# Patient Record
Sex: MALE | Race: WHITE | Employment: UNEMPLOYED | URBAN - METROPOLITAN AREA
[De-identification: names, ages, dates, MRNs, and addresses within clinical notes are randomized per-mention and may not be internally consistent; named-entity substitution may affect disease eponyms.]

---

## 2020-09-22 ENCOUNTER — HOSPITAL ENCOUNTER (OUTPATIENT)
Dept: GENERAL RADIOLOGY | Age: 25
Discharge: HOME | End: 2020-09-22
Attending: INTERNAL MEDICINE
Payer: MEDICAID

## 2020-09-22 DIAGNOSIS — R05.9 COUGH: ICD-10-CM

## 2020-09-22 PROCEDURE — 71046 X-RAY EXAM CHEST 2 VIEWS: CPT

## 2020-10-08 ENCOUNTER — TRANSCRIBE ORDER (OUTPATIENT)
Dept: SCHEDULING | Age: 25
End: 2020-10-08

## 2020-10-08 DIAGNOSIS — R93.89 ABNORMAL CHEST X-RAY: Primary | ICD-10-CM

## 2020-10-19 ENCOUNTER — TRANSCRIBE ORDER (OUTPATIENT)
Dept: SCHEDULING | Age: 25
End: 2020-10-19

## 2020-10-19 DIAGNOSIS — R05.9 COUGH: Primary | ICD-10-CM

## 2020-11-02 ENCOUNTER — TRANSCRIBE ORDER (OUTPATIENT)
Dept: SCHEDULING | Age: 25
End: 2020-11-02

## 2020-11-02 DIAGNOSIS — J84.10 PULMONARY FIBROSIS (HCC): Primary | ICD-10-CM

## 2020-11-18 ENCOUNTER — HOSPITAL ENCOUNTER (OUTPATIENT)
Dept: CT IMAGING | Age: 25
Discharge: HOME | End: 2020-11-18
Attending: INTERNAL MEDICINE
Payer: MEDICAID

## 2020-11-18 DIAGNOSIS — J84.10 PULMONARY FIBROSIS (HCC): ICD-10-CM

## 2020-11-18 PROCEDURE — 71250 CT THORAX DX C-: CPT

## 2021-09-24 ENCOUNTER — HOSPITAL ENCOUNTER (EMERGENCY)
Age: 26
Discharge: HOME | End: 2021-09-24
Attending: EMERGENCY MEDICINE
Payer: MEDICAID

## 2021-09-24 VITALS
OXYGEN SATURATION: 94 % | HEART RATE: 110 BPM | RESPIRATION RATE: 20 BRPM | TEMPERATURE: 100 F | WEIGHT: 160 LBS | BODY MASS INDEX: 21.67 KG/M2 | DIASTOLIC BLOOD PRESSURE: 76 MMHG | SYSTOLIC BLOOD PRESSURE: 124 MMHG | HEIGHT: 72 IN

## 2021-09-24 DIAGNOSIS — T50.Z95A VACCINE REACTION, INITIAL ENCOUNTER: Primary | ICD-10-CM

## 2021-09-24 PROCEDURE — 99281 EMR DPT VST MAYX REQ PHY/QHP: CPT

## 2021-09-24 NOTE — ED PROVIDER NOTES
75-year-old male with a past medical history significant for autism who presents to the ER accompanied by his mother who state that the patient had the first dose of the vaccine today and seemed to be having chills, body aches, abdominal cramp with nausea and diaphoresis. Mother states that she was concerned that her son was going into anaphylactic shock. The patient denies any headache, sore throat, congestion, chest pain or shortness of breath, diarrhea, constipation, dysuria, dizziness, extremity weakness or numbness. No past medical history on file. No past surgical history on file. No family history on file. Social History     Socioeconomic History    Marital status: SINGLE     Spouse name: Not on file    Number of children: Not on file    Years of education: Not on file    Highest education level: Not on file   Occupational History    Not on file   Tobacco Use    Smoking status: Not on file   Substance and Sexual Activity    Alcohol use: Not on file    Drug use: Not on file    Sexual activity: Not on file   Other Topics Concern    Not on file   Social History Narrative    Not on file     Social Determinants of Health     Financial Resource Strain:     Difficulty of Paying Living Expenses:    Food Insecurity:     Worried About Running Out of Food in the Last Year:     [address] in the Last Year:    Transportation Needs:     Lack of Transportation (Medical):      Lack of Transportation (Non-Medical):    Physical Activity:     Days of Exercise per Week:     Minutes of Exercise per Session:    Stress:     Feeling of Stress :    Social Connections:     Frequency of Communication with Friends and Family:     Frequency of Social Gatherings with Friends and Family:     Attends Zoroastrianism Services:     Active Member of Clubs or Organizations:     Attends Club or Organization Meetings:     Marital Status:    Intimate Partner Violence:     Fear of Current or Ex-Partner:     Emotionally Abused:     Physically Abused:     Sexually Abused: ALLERGIES: Egg, Peanut, Propofol, Seafood, and Fentanyl    Review of Systems   All other systems reviewed and are negative. Vitals:    09/24/21 0010   BP: 124/76   Pulse: (!) 110   Resp: 20   Temp: 100 °F (37.8 °C)   SpO2: 94%   Weight: 72.6 kg (160 lb)   Height: 6' (1.829 m)            Physical Exam  Vitals and nursing note reviewed. CONSTITUTIONAL: Well-appearing; well-nourished; in no apparent distress  HEAD: Normocephalic; atraumatic  EYES: PERRL; EOM intact; conjunctiva and sclera are clear bilaterally. ENT: No rhinorrhea; normal pharynx with no tonsillar hypertrophy; mucous membranes pink/moist, no erythema, no exudate. NECK: Supple; non-tender; no cervical lymphadenopathy  CARD: Normal S1, S2; no murmurs, rubs, or gallops. Regular rate and rhythm. RESP: Normal respiratory effort; breath sounds clear and equal bilaterally; no wheezes, rhonchi, or rales. ABD: Normal bowel sounds; non-distended; non-tender; no palpable organomegaly, no masses, no bruits. Back Exam: Normal inspection; no vertebral point tenderness, no CVA tenderness. Normal range of motion. EXT: Normal ROM in all four extremities; non-tender to palpation; no swelling or deformity; distal pulses are normal, no edema. SKIN: Warm; dry; no rash. NEURO:Alert and oriented x 3, coherent, HANSA-XII grossly intact, sensory and motor are non-focal.        MDM  Number of Diagnoses or Management Options  Vaccine reaction, initial encounter  Diagnosis management comments: Assessment: 17-year-old male who presents to the ER for multitude of symptoms likely related to side effect from the vaccine. He is hemodynamically stable and well. He has a nonfocal exam.  His mother is at the bedside. Plan: Education, reassurance, symptomatic treatment/Tylenol or ibuprofen for pain and fever as needed/ Monitor and Reevaluate.          Amount and/or Complexity of Data Reviewed  Clinical lab tests: ordered and reviewed  Tests in the radiology section of CPT®: ordered and reviewed  Tests in the medicine section of CPT®: reviewed and ordered  Discussion of test results with the performing providers: yes  Decide to obtain previous medical records or to obtain history from someone other than the patient: yes  Obtain history from someone other than the patient: yes  Review and summarize past medical records: yes  Discuss the patient with other providers: yes  Independent visualization of images, tracings, or specimens: yes    Risk of Complications, Morbidity, and/or Mortality  Presenting problems: moderate  Diagnostic procedures: moderate  Management options: moderate    Patient Progress  Patient progress: stable         Procedures    Progress Note:   Pt has been reexamined by Andrea Shipman MD. Pt is feeling much better. Symptoms have improved. All available results have been reviewed with pt and any available family. Pt understands sx, dx, and tx in ED. Care plan has been outlined and questions have been answered. Pt is ready to go home. Will send home on acute febrile illness COVID-19 vaccination instruction. Outpatient referral with PCP as needed. Written by Andrea Shipman MD,3:55 AM    .   .

## 2021-09-24 NOTE — ED TRIAGE NOTES
Pt arrives with complaint of allergic reaction to COVID vaccine today. Pt with nausea. HX of allergies to peanuts and egg.

## 2024-06-08 ENCOUNTER — HOSPITAL ENCOUNTER (OUTPATIENT)
Facility: HOSPITAL | Age: 29
End: 2024-06-08
Attending: INTERNAL MEDICINE
Payer: MEDICAID

## 2024-06-08 DIAGNOSIS — R10.84 GENERALIZED ABDOMINAL PAIN: ICD-10-CM

## 2024-06-08 PROCEDURE — 74181 MRI ABDOMEN W/O CONTRAST: CPT

## 2024-08-30 ENCOUNTER — TELEPHONE (OUTPATIENT)
Age: 29
End: 2024-08-30

## 2024-08-30 NOTE — TELEPHONE ENCOUNTER
Called Ms. Beltran to schedule new pt appt with Dr. Cervantes for her son. Appt scheduled with Dr. Cervantes.

## 2024-09-09 ENCOUNTER — TELEPHONE (OUTPATIENT)
Age: 29
End: 2024-09-09

## 2024-09-10 ENCOUNTER — OFFICE VISIT (OUTPATIENT)
Age: 29
End: 2024-09-10
Payer: COMMERCIAL

## 2024-09-10 VITALS
HEART RATE: 101 BPM | RESPIRATION RATE: 18 BRPM | DIASTOLIC BLOOD PRESSURE: 82 MMHG | HEIGHT: 72 IN | OXYGEN SATURATION: 96 % | TEMPERATURE: 98.1 F | WEIGHT: 180.6 LBS | SYSTOLIC BLOOD PRESSURE: 122 MMHG | BODY MASS INDEX: 24.46 KG/M2

## 2024-09-10 DIAGNOSIS — K81.9 CHOLECYSTITIS: Primary | ICD-10-CM

## 2024-09-10 PROCEDURE — 99204 OFFICE O/P NEW MOD 45 MIN: CPT | Performed by: SURGERY

## 2024-09-10 RX ORDER — EPINEPHRINE 0.3 MG/.3ML
0.3 INJECTION SUBCUTANEOUS ONCE
COMMUNITY

## 2024-09-10 RX ORDER — ALBUTEROL SULFATE 90 UG/1
2 AEROSOL, METERED RESPIRATORY (INHALATION) EVERY 4 HOURS PRN
COMMUNITY
Start: 2013-12-23

## 2024-09-10 RX ORDER — BUDESONIDE AND FORMOTEROL FUMARATE DIHYDRATE 160; 4.5 UG/1; UG/1
2 AEROSOL RESPIRATORY (INHALATION) AS NEEDED
COMMUNITY
Start: 2013-12-23

## 2024-09-10 ASSESSMENT — PATIENT HEALTH QUESTIONNAIRE - PHQ9
SUM OF ALL RESPONSES TO PHQ QUESTIONS 1-9: 0
SUM OF ALL RESPONSES TO PHQ QUESTIONS 1-9: 0
1. LITTLE INTEREST OR PLEASURE IN DOING THINGS: NOT AT ALL
SUM OF ALL RESPONSES TO PHQ9 QUESTIONS 1 & 2: 0
SUM OF ALL RESPONSES TO PHQ QUESTIONS 1-9: 0
SUM OF ALL RESPONSES TO PHQ QUESTIONS 1-9: 0
2. FEELING DOWN, DEPRESSED OR HOPELESS: NOT AT ALL

## 2024-09-12 ENCOUNTER — TELEPHONE (OUTPATIENT)
Age: 29
End: 2024-09-12

## 2024-09-13 ENCOUNTER — TELEPHONE (OUTPATIENT)
Age: 29
End: 2024-09-13

## 2024-09-13 ENCOUNTER — OFFICE VISIT (OUTPATIENT)
Age: 29
End: 2024-09-13
Payer: MEDICAID

## 2024-09-13 VITALS
RESPIRATION RATE: 16 BRPM | TEMPERATURE: 98.7 F | HEART RATE: 88 BPM | OXYGEN SATURATION: 95 % | BODY MASS INDEX: 24.35 KG/M2 | HEIGHT: 72 IN | SYSTOLIC BLOOD PRESSURE: 118 MMHG | WEIGHT: 179.8 LBS | DIASTOLIC BLOOD PRESSURE: 81 MMHG

## 2024-09-13 DIAGNOSIS — Z00.00 ENCOUNTER FOR MEDICAL EXAMINATION TO ESTABLISH CARE: Primary | ICD-10-CM

## 2024-09-13 DIAGNOSIS — J45.20 MILD INTERMITTENT ASTHMA WITHOUT COMPLICATION: ICD-10-CM

## 2024-09-13 DIAGNOSIS — K76.0 NONALCOHOLIC HEPATOSTEATOSIS: ICD-10-CM

## 2024-09-13 DIAGNOSIS — K80.20 CALCULUS OF GALLBLADDER WITHOUT CHOLECYSTITIS WITHOUT OBSTRUCTION: ICD-10-CM

## 2024-09-13 DIAGNOSIS — R62.50 DEVELOPMENTAL DELAY: ICD-10-CM

## 2024-09-13 DIAGNOSIS — R13.19 OTHER DYSPHAGIA: ICD-10-CM

## 2024-09-13 PROBLEM — L21.9 SEBORRHEIC DERMATITIS: Status: ACTIVE | Noted: 2024-09-13

## 2024-09-13 PROBLEM — D22.9 MULTIPLE BENIGN NEVI: Status: ACTIVE | Noted: 2024-08-29

## 2024-09-13 PROBLEM — L50.9 URTICARIAL DERMATITIS: Status: ACTIVE | Noted: 2024-09-13

## 2024-09-13 PROBLEM — D22.71 MELANOCYTIC NEVUS OF RIGHT LOWER EXTREMITY: Status: ACTIVE | Noted: 2024-04-16

## 2024-09-13 PROCEDURE — 99204 OFFICE O/P NEW MOD 45 MIN: CPT

## 2024-09-13 SDOH — ECONOMIC STABILITY: INCOME INSECURITY: HOW HARD IS IT FOR YOU TO PAY FOR THE VERY BASICS LIKE FOOD, HOUSING, MEDICAL CARE, AND HEATING?: NOT VERY HARD

## 2024-09-13 SDOH — ECONOMIC STABILITY: FOOD INSECURITY: WITHIN THE PAST 12 MONTHS, YOU WORRIED THAT YOUR FOOD WOULD RUN OUT BEFORE YOU GOT MONEY TO BUY MORE.: NEVER TRUE

## 2024-09-13 SDOH — ECONOMIC STABILITY: FOOD INSECURITY: WITHIN THE PAST 12 MONTHS, THE FOOD YOU BOUGHT JUST DIDN'T LAST AND YOU DIDN'T HAVE MONEY TO GET MORE.: NEVER TRUE

## 2024-09-13 ASSESSMENT — ENCOUNTER SYMPTOMS
ABDOMINAL PAIN: 0
NAUSEA: 0
BLOOD IN STOOL: 0
DIARRHEA: 0
SHORTNESS OF BREATH: 0
SINUS PRESSURE: 0
COUGH: 1
WHEEZING: 0
SINUS PAIN: 0
SORE THROAT: 0
VOMITING: 0
TROUBLE SWALLOWING: 0

## 2024-09-13 ASSESSMENT — PATIENT HEALTH QUESTIONNAIRE - PHQ9
2. FEELING DOWN, DEPRESSED OR HOPELESS: NOT AT ALL
SUM OF ALL RESPONSES TO PHQ QUESTIONS 1-9: 0
SUM OF ALL RESPONSES TO PHQ9 QUESTIONS 1 & 2: 0
1. LITTLE INTEREST OR PLEASURE IN DOING THINGS: NOT AT ALL
SUM OF ALL RESPONSES TO PHQ QUESTIONS 1-9: 0

## 2024-09-17 ENCOUNTER — TELEPHONE (OUTPATIENT)
Age: 29
End: 2024-09-17

## 2024-09-17 DIAGNOSIS — R10.13 EPIGASTRIC PAIN: Primary | ICD-10-CM

## 2024-09-23 ENCOUNTER — TELEPHONE (OUTPATIENT)
Age: 29
End: 2024-09-23

## 2024-10-09 ENCOUNTER — HOSPITAL ENCOUNTER (OUTPATIENT)
Facility: HOSPITAL | Age: 29
Discharge: HOME | End: 2024-10-12
Attending: SURGERY
Payer: MEDICAID

## 2024-10-09 DIAGNOSIS — R10.13 EPIGASTRIC PAIN: ICD-10-CM

## 2024-10-09 PROCEDURE — 76705 ECHO EXAM OF ABDOMEN: CPT

## 2024-10-15 ENCOUNTER — TELEPHONE (OUTPATIENT)
Age: 29
End: 2024-10-15

## 2024-10-15 NOTE — TELEPHONE ENCOUNTER
PSR transferred call to triage. Pt's mother Anneliese (on HIPAA) called requested to schedule pt appt for tomorrow 10/16/24. Per pt's mother pt has c/o chest congestion, \"fluid building up in his chest\" wheezing and SOB, \"for a week.\" Explained to pt's mother it would be best for pt to be further evaluated tonight vs waiting until tomorrow. Pt's mother verbalized understanding, stated she has tried to get him to go to  but pt refused. Red flags reviewed for urgent eval. Assisted in scheduling an appt with NP tomorrow at 1 PM (appt time requested).

## 2024-10-16 ENCOUNTER — TELEPHONE (OUTPATIENT)
Age: 29
End: 2024-10-16

## 2024-10-16 NOTE — TELEPHONE ENCOUNTER
Patients mother called and wanted to have results for her sons ultrasound ordered by Dr. Bryant. Patients mother would like a call back from Dr. Bryant in regards to going over imaging from test.

## 2024-10-16 NOTE — TELEPHONE ENCOUNTER
Patient returned call from nurse. Patient would like a call back in regards to imaging from her sons ultrasound.

## 2024-10-16 NOTE — TELEPHONE ENCOUNTER
Returned call to Anneliese Beltran who verified her son Magdiel Beltran using 2 patient identifiers. Ms Beltran was calling to get the results of the ultrasound that was done last week and what will be the next step.    I informed Ms. Beltran that I will forward this information to Dr. Bryant to make her aware that she was calling for the Ultrasound results. Pt made aware that I may not have a response until tomorrow and she voiced understandings.

## 2024-10-17 ENCOUNTER — TELEPHONE (OUTPATIENT)
Age: 29
End: 2024-10-17

## 2024-10-17 NOTE — TELEPHONE ENCOUNTER
----- Message from Dr. Shona Bryant MD sent at 10/16/2024  6:48 PM EDT -----  Regarding: RE: Ultrasound results  Ultrasound shows that he has gallstones    If he is still having upper abdominal tenderness, bloating, and all the symptoms they reported when they saw me in the office, then my recommendation is that they should proceed with with removing the gallbladder and surgery  ----- Message -----  From: Anita Jimenez LPN  Sent: 10/16/2024   4:05 PM EDT  To: Shona Bryant MD  Subject: Ultrasound results                               Pts Mom was calling to get the results of the US report and what are the next steps.    Thanks,  Anita

## 2024-10-18 NOTE — TELEPHONE ENCOUNTER
Returned call to Anneliese ( Mother) [facility] who verified patient using 2 patient identifiers. She was informed of the information below as per Dr. Bryant. If he was still having abdominal tenderness,bloating and symptoms that he had when they were in the office she recommends to proceed with the surgery.    She states that she will have to talk with her Son as he is to see how he is feeling. He does not verbalize pain much but he does seem to still be tender at times. She states that they will discuss this and will call the office back or schedule an appointment regarding the surgery. She did request to have a copy of the US report and she will come to the office to  the copy.

## 2025-03-07 ENCOUNTER — ANESTHESIA EVENT (OUTPATIENT)
Facility: HOSPITAL | Age: 30
End: 2025-03-07
Payer: MEDICAID

## 2025-03-11 ENCOUNTER — HOSPITAL ENCOUNTER (OUTPATIENT)
Facility: HOSPITAL | Age: 30
Setting detail: OUTPATIENT SURGERY
Discharge: HOME | End: 2025-03-11
Attending: INTERNAL MEDICINE | Admitting: INTERNAL MEDICINE
Payer: MEDICAID

## 2025-03-11 ENCOUNTER — ANESTHESIA (OUTPATIENT)
Facility: HOSPITAL | Age: 30
End: 2025-03-11
Payer: MEDICAID

## 2025-03-11 VITALS
WEIGHT: 169.97 LBS | HEART RATE: 110 BPM | RESPIRATION RATE: 12 BRPM | TEMPERATURE: 98.4 F | OXYGEN SATURATION: 94 % | SYSTOLIC BLOOD PRESSURE: 141 MMHG | HEIGHT: 72 IN | BODY MASS INDEX: 23.02 KG/M2 | DIASTOLIC BLOOD PRESSURE: 93 MMHG

## 2025-03-11 PROCEDURE — 3600007502: Performed by: INTERNAL MEDICINE

## 2025-03-11 PROCEDURE — 7100000011 HC PHASE II RECOVERY - ADDTL 15 MIN: Performed by: INTERNAL MEDICINE

## 2025-03-11 PROCEDURE — 7100000010 HC PHASE II RECOVERY - FIRST 15 MIN: Performed by: INTERNAL MEDICINE

## 2025-03-11 PROCEDURE — 88305 TISSUE EXAM BY PATHOLOGIST: CPT

## 2025-03-11 PROCEDURE — 2580000003 HC RX 258: Performed by: INTERNAL MEDICINE

## 2025-03-11 PROCEDURE — 3700000000 HC ANESTHESIA ATTENDED CARE: Performed by: INTERNAL MEDICINE

## 2025-03-11 PROCEDURE — 6360000002 HC RX W HCPCS: Performed by: NURSE ANESTHETIST, CERTIFIED REGISTERED

## 2025-03-11 PROCEDURE — 2709999900 HC NON-CHARGEABLE SUPPLY: Performed by: INTERNAL MEDICINE

## 2025-03-11 RX ORDER — SODIUM CHLORIDE 0.9 % (FLUSH) 0.9 %
5-40 SYRINGE (ML) INJECTION PRN
Status: DISCONTINUED | OUTPATIENT
Start: 2025-03-11 | End: 2025-03-11 | Stop reason: HOSPADM

## 2025-03-11 RX ORDER — PROPOFOL 10 MG/ML
INJECTION, EMULSION INTRAVENOUS
Status: DISCONTINUED | OUTPATIENT
Start: 2025-03-11 | End: 2025-03-11 | Stop reason: SDUPTHER

## 2025-03-11 RX ORDER — SODIUM CHLORIDE 9 MG/ML
INJECTION, SOLUTION INTRAVENOUS PRN
Status: DISCONTINUED | OUTPATIENT
Start: 2025-03-11 | End: 2025-03-11 | Stop reason: HOSPADM

## 2025-03-11 RX ORDER — SODIUM CHLORIDE 0.9 % (FLUSH) 0.9 %
5-40 SYRINGE (ML) INJECTION EVERY 12 HOURS SCHEDULED
Status: DISCONTINUED | OUTPATIENT
Start: 2025-03-11 | End: 2025-03-11 | Stop reason: HOSPADM

## 2025-03-11 RX ORDER — LIDOCAINE HYDROCHLORIDE 20 MG/ML
INJECTION, SOLUTION EPIDURAL; INFILTRATION; INTRACAUDAL; PERINEURAL
Status: DISCONTINUED | OUTPATIENT
Start: 2025-03-11 | End: 2025-03-11 | Stop reason: SDUPTHER

## 2025-03-11 RX ADMIN — PROPOFOL 10 MG: 10 INJECTION, EMULSION INTRAVENOUS at 08:30

## 2025-03-11 RX ADMIN — LIDOCAINE HYDROCHLORIDE 20 MG: 20 INJECTION, SOLUTION EPIDURAL; INFILTRATION; INTRACAUDAL at 08:28

## 2025-03-11 RX ADMIN — PROPOFOL 140 MCG/KG/MIN: 10 INJECTION, EMULSION INTRAVENOUS at 08:27

## 2025-03-11 RX ADMIN — SODIUM CHLORIDE: 9 INJECTION, SOLUTION INTRAVENOUS at 08:23

## 2025-03-11 RX ADMIN — PROPOFOL 50 MG: 10 INJECTION, EMULSION INTRAVENOUS at 08:29

## 2025-03-11 RX ADMIN — PROPOFOL 50 MG: 10 INJECTION, EMULSION INTRAVENOUS at 08:28

## 2025-03-11 RX ADMIN — LIDOCAINE HYDROCHLORIDE 60 MG: 20 INJECTION, SOLUTION EPIDURAL; INFILTRATION; INTRACAUDAL at 08:27

## 2025-03-11 ASSESSMENT — PAIN SCALES - GENERAL
PAINLEVEL_OUTOF10: 0

## 2025-03-11 ASSESSMENT — PAIN - FUNCTIONAL ASSESSMENT
PAIN_FUNCTIONAL_ASSESSMENT: 0-10
PAIN_FUNCTIONAL_ASSESSMENT: 0-10

## 2025-03-11 NOTE — PROCEDURES
[facility]  Garrick Stuart M.D.  [phone]           3/11/2025                EGD Operative Report  Magdiel Beltran JrCarlton  :  1995  [facility] Medical Record Number:  [id number]      Indication: Dysphagia     : Garrick Stuart MD    Assistant -- None  Implants -- None    Referring Provider:  Gene Cervantes MD      Anesthesia/Sedation:  MAC anesthesia Propofol    Airway assessment: No airway problems anticipated    Pre-Procedural Exam:      Airway: clear, no airway problems anticipated  Heart: RRR, without gallops or rubs  Lungs: clear bilaterally without wheezes, crackles, or rhonchi  Abdomen: soft, nontender, nondistended, bowel sounds present  Mental Status: awake, alert and oriented to person, place and time       Procedure Details     After infomed consent was obtained for the procedure, with all risks and benefits of procedure explained the patient was taken to the endoscopy suite and placed in the left lateral decubitus position.  Following sequential administration of sedation as per above, the endoscope was inserted into the mouth and advanced under direct vision to second portion of the duodenum.  A careful inspection was made as the gastroscope was withdrawn, including a retroflexed view of the proximal stomach; findings and interventions are described below.      Findings:   Esophagus:normal  Stomach: normal  and small 1cms hiatal hernia noted  Duodenum/jejunum: normal    Therapies:  biopsy of distal esophagus    Specimens:  as above           Complications:   None; patient tolerated the procedure well.    EBL:  None.           Impression:    Small hiatal hernia otherwise Normal Exam. No need for dilation noted    Recommendations:    -Await pathology.  -Follow up as needed    Garrick Stuart MD

## 2025-03-11 NOTE — H&P
[facility]  Garrick Stuart M.D.  [phone]            History and Physical       NAME:  Magdiel Beltran Jr.   :   1995   MRN:   [id number]       Referring Physician:  Gene Cervantes MD      Consult Date: 3/11/2025 7:50 AM    Chief Complaint:  dysphagia    History of Present Illness:  Patient is a 29 y.o. who is seen for dysphagia. Denies any ongoing GI complaints.      PMH:  Past Medical History:   Diagnosis Date    Allergies     Asthma     Dysphagia     Fatty liver     per ultrasound    GERD (gastroesophageal reflux disease)        PSH:  Past Surgical History:   Procedure Laterality Date    UPPER GASTROINTESTINAL ENDOSCOPY         Allergies:  Allergies   Allergen Reactions    Egg Solids, Whole Anaphylaxis    Egg-Derived Products Anaphylaxis and Other (See Comments)     Reaction Type: Allergy; Reaction(s): anaphalactic    Other Reaction(s): ANAPHYLAXIS    Veronica Beans Other (See Comments)     Reaction Type: Allergy; Reaction(s): life threatening    Fentanyl Anaphylaxis and Shortness Of Breath     Reaction Type: Allergy; Severity: Severe    Fish-Derived Products Anaphylaxis    Iodinated Contrast Media Anaphylaxis     Reaction Type: Allergy    Milk-Related Compounds Anaphylaxis and Other (See Comments)     Reaction Type: Allergy; Reaction(s): life threatening    Peanut Oil Anaphylaxis and Other (See Comments)     Reaction Type: Allergy; Reaction(s): anaphalactic    Peanut-Containing Drug Products Anaphylaxis    Propofol Anaphylaxis and Other (See Comments)     Other Reaction(s): anaphylaxis    Reaction Type: Side Effect; Severity: Severe; Reaction(s): life threatening    Shellfish Allergy Anaphylaxis and Other (See Comments)     Reaction Type: Allergy; Reaction(s): anaphalactic    Other Reaction(s): anaphylaxis    Shellfish-Derived Products Anaphylaxis    Compleat      Other Reaction(s): anaphylaxis    Digestive Enzymes     Lactose Intolerance (Gi)     Milk (Cow)     Other      legumes

## 2025-03-11 NOTE — PROGRESS NOTES
Magdiel Beltran Jr.  1995  [id number]    Situation:  Verbal report received from:  NETO Pedroza   Procedure: Procedure(s):  ESOPHAGOGASTRODUODENOSCOPY    Background:    Preoperative diagnosis: Umbilical pain [R10.33]  Dysphagia, unspecified type [R13.10]  Abdominal pain, left lower quadrant [R10.32]  Postoperative diagnosis: * No post-op diagnosis entered *    :  Dr. Stuart   Assistant(s): Circulator: Kasia Roche RN  Endoscopy Technician: Hector Amezquita    Specimens:   ID Type Source Tests Collected by Time Destination   1 : Distal Esophagus Biopsies Tissue Esophagus SURGICAL PATHOLOGY Garrick Stuart MD 3/11/2025 0830      H. Pylori     no    Assessment:  Intra-procedure medications     Anesthesia gave intra-procedure sedation and medications, see anesthesia flow sheet    yes    Intravenous fluids: NS@ KVO     Vital signs stable    yes    Abdominal assessment: round and soft    yes    Recommendation:  Discharge patient per MD order  yes.  Return to floor  outpatient  Family or Friend   mom   Permission to share finding with family or friend    yes

## 2025-03-11 NOTE — ANESTHESIA PRE PROCEDURE
Department of Anesthesiology  Preprocedure Note       Name:  Magdiel Beltran Jr.   Age:  29 y.o.  :  1995                                          MRN:  [id number]         Date:  3/11/2025      Surgeon: Surgeon(s):  Garrick Stuart MD    Procedure: Procedure(s):  ESOPHAGOGASTRODUODENOSCOPY    Medications prior to admission:   Prior to Admission medications    Medication Sig Start Date End Date Taking? Authorizing Provider   SYMBICORT 160-4.5 MCG/ACT AERO Inhale 2 puffs into the lungs as needed 13  Yes ProviderConnor MD   PROAIR  (90 Base) MCG/ACT inhaler Inhale 2 puffs into the lungs every 4 hours as needed 13  Yes Connor Bedolla MD   EPINEPHrine (EPIPEN) 0.3 MG/0.3ML SOAJ injection 0.3 mLs once As needed    ProviderConnor MD       Current medications:    Current Facility-Administered Medications   Medication Dose Route Frequency Provider Last Rate Last Admin   • sodium chloride flush 0.9 % injection 5-40 mL  5-40 mL IntraVENous 2 times per day Garrick Stuart MD       • sodium chloride flush 0.9 % injection 5-40 mL  5-40 mL IntraVENous PRN Garrick Stuart MD       • 0.9 % sodium chloride infusion   IntraVENous PRN Garrick Stuart MD           Allergies:    Allergies   Allergen Reactions   • Egg Solids, Whole Anaphylaxis   • Egg-Derived Products Anaphylaxis and Other (See Comments)     Reaction Type: Allergy; Reaction(s): anaphalactic    Other Reaction(s): ANAPHYLAXIS   • Veronica Beans Other (See Comments)     Reaction Type: Allergy; Reaction(s): life threatening   • Fentanyl Anaphylaxis and Shortness Of Breath     Reaction Type: Allergy; Severity: Severe   • Fish-Derived Products Anaphylaxis   • Iodinated Contrast Media Anaphylaxis     Reaction Type: Allergy   • Milk-Related Compounds Anaphylaxis and Other (See Comments)     Reaction Type: Allergy; Reaction(s): life threatening   • Peanut Oil Anaphylaxis and Other (See Comments)     Reaction Type: Allergy; Reaction(s):

## 2025-03-11 NOTE — DISCHARGE INSTRUCTIONS
[facility]  Garrick Stuart M.D.  [phone]         EGD DISCHARGE INSTRUCTIONS      Magdiel SULMA Beltran Jr.  [id number]  1995    DISCOMFORT:  Sore throat- throat lozenges or warm salt water gargle  Redness at IV site- apply warm compress to area; if redness or soreness persist- contact your physician  Gaseous discomfort- walking, belching will help relieve any discomfort  You may not operate a vehicle for 12 hours  You may not engage in an occupation involving machinery or appliances for the  rest of today  You may not drink alcoholic beverages for at least 12 hours  Avoid making any critical decisions for at least 24 hours    DIET:   You may resume your normal diet, but some patients find that heavy or large  meals may lead to indigestion or vomiting. I suggest a light meal as first food  intake.    ACTIVITY:  You may resume your normal daily activities.  It is recommended that you spend the remainder of the day resting - avoid any strenuous activity.    CALL M.DCarlton IF ANY SIGN OF:   Increasing pain, nausea, vomiting  Abdominal distension (swelling)  Significant bleeding (oral or rectal)  Fever   Pain in chest area  Shortness of breath    Additional Instructions:   Call Dr. Stuart if any questions or problems at [phone]   Setup follow-up office visit in 2-3 weeks  You should receive the biopsy results by phone or mail within 3 weeks, if not, call my office for the results    Findings -- normal exam

## 2025-03-11 NOTE — ANESTHESIA POSTPROCEDURE EVALUATION
Department of Anesthesiology  Postprocedure Note    Patient: Magdiel Beltran Jr.  MRN: [id number]  YOB: 1995  Date of evaluation: 3/11/2025    Procedure Summary       Date: 03/11/25 Room / Location: [place] [address] / [facility] ENDOSCOPY    Anesthesia Start: 0823 Anesthesia Stop: 0834    Procedure: ESOPHAGOGASTRODUODENOSCOPY (Upper GI Region) Diagnosis:       Umbilical pain      Dysphagia, unspecified type      Abdominal pain, left lower quadrant      (Umbilical pain [R10.33])      (Dysphagia, unspecified type [R13.10])      (Abdominal pain, left lower quadrant [R10.32])    Surgeons: Garrick Stuart MD Responsible Provider: Dave Redmond DO    Anesthesia Type: MAC ASA Status: 2            Anesthesia Type: MAC    Derek Phase I: Derek Score: 10    Derek Phase II: Derek Score: 9    Anesthesia Post Evaluation    Patient location during evaluation: PACU  Patient participation: complete - patient participated  Level of consciousness: awake and alert (At baseline)  Pain score: 0  Airway patency: patent  Nausea & Vomiting: no vomiting and no nausea  Cardiovascular status: hemodynamically stable  Respiratory status: acceptable  Hydration status: stable  Comments: Patient seen and examined.  Ready for discharge from PACU.  Pain management: adequate    No notable events documented.

## 2025-03-11 NOTE — PROGRESS NOTES
Initial RN admission and assessment performed and documented in Endoscopy navigator.     Patient evaluated by anesthesia in pre-procedure holding.     All procedural vital signs, airway assessment, and level of consciousness information monitored and recorded by anesthesia staff on the anesthesia record.     Report received from CRNA post procedure.  Patient transported to recovery area by RN.    Endoscopy post procedure time out was performed and specimens were verified by physician.    Endoscope was pre-cleaned at bedside immediately following procedure by Hector [email or web address]¡ tech.

## (undated) DEVICE — 1200 GUARD II KIT W/5MM TUBE W/O VAC TUBE: Brand: GUARDIAN

## (undated) DEVICE — BLUNTFILL: Brand: MONOJECT

## (undated) DEVICE — IV STRT KT 3282] LSL INDUSTRIES INC]

## (undated) DEVICE — SET ADMIN 16ML TBNG L100IN 2 Y INJ SITE IV PIGGY BK DISP (ORDER IN MULIPLES OF 48)

## (undated) DEVICE — BITEBLOCK ENDOSCP 60FR MAXI WHT POLYETH STURDY W/ VELC WVN

## (undated) DEVICE — SOLIDIFIER FLD 2OZ 1500CC N DISINF IN BTL DISP SAFESORB

## (undated) DEVICE — CANNULA CUSH AD W/ 14FT TBG

## (undated) DEVICE — SYRINGE MED 5ML STD CLR PLAS LUERLOCK TIP N CTRL DISP

## (undated) DEVICE — CATHETER IV 22GA L1IN OD0.8382-0.9144MM ID0.6096-0.6858MM 382523

## (undated) DEVICE — BLUNTFILL WITH FILTER: Brand: MONOJECT

## (undated) DEVICE — KIT COLON W/ 1.1OZ LUB AND 2 END

## (undated) DEVICE — ELECTRODE,RADIOTRANSLUCENT,FOAM,3PK: Brand: MEDLINE

## (undated) DEVICE — SYRINGE MEDICAL 3ML CLEAR PLASTIC STANDARD NON CONTROL LUERLOCK TIP DISPOSABLE